# Patient Record
Sex: FEMALE | Race: OTHER
[De-identification: names, ages, dates, MRNs, and addresses within clinical notes are randomized per-mention and may not be internally consistent; named-entity substitution may affect disease eponyms.]

---

## 2017-12-14 ENCOUNTER — HOSPITAL ENCOUNTER (EMERGENCY)
Dept: HOSPITAL 11 - JP.ED | Age: 42
Discharge: HOME | End: 2017-12-14
Payer: SELF-PAY

## 2017-12-14 DIAGNOSIS — F17.210: ICD-10-CM

## 2017-12-14 DIAGNOSIS — J11.1: Primary | ICD-10-CM

## 2017-12-14 PROCEDURE — 99284 EMERGENCY DEPT VISIT MOD MDM: CPT

## 2017-12-14 NOTE — EDM.PDOC
ED HPI GENERAL MEDICAL PROBLEM





- General


Chief Complaint: Gastrointestinal Problem


Stated Complaint: DIARRHEA


Time Seen by Provider: 12/14/17 15:29


Source of Information: Reports: Patient


History Limitations: Reports: No Limitations





- History of Present Illness


INITIAL COMMENTS - FREE TEXT/NARRATIVE: 





pt works at [employer] and was sent home from work last nite because vomiting and 

diarrhea.  She is not feeling well enough to be at work tonight. 


Onset: Other (last nite. )


Duration: Hour(s):, Other (pt has been holding fluids sown. )


Location: Reports: Abdomen, Other (pt is not hAVING PAIN. )


Associated Symptoms: Reports: Other ( PT HAS HAD DIARRHEA. )


  ** dENIES


Pain Score (Numeric/FACES): 0





- Related Data


 Allergies











Allergy/AdvReac Type Severity Reaction Status Date / Time


 


No Known Allergies Allergy   Verified 12/14/17 15:04











Home Meds: 


 Home Meds





NK [No Known Home Meds]  12/14/17 [History]











Past Medical History


HEENT History: Reports: Impaired Vision


OB/GYN History: Reports: Pregnancy





- Infectious Disease History


Infectious Disease History: Reports: Chicken Pox





- Past Surgical History


Female  Surgical History: Reports: Tubal Ligation





Social & Family History





- Tobacco Use


Smoking Status *Q: Current Some Day Smoker


Years of Tobacco use: 20


Packs/Tins Daily: 1


Used Tobacco, but Quit: No





- Caffeine Use


Caffeine Use: Reports: Coffee, Energy Drinks





- Recreational Drug Use


Recreational Drug Use: No





ED ROS GENERAL





- Review of Systems


Review Of Systems: See Below


Constitutional: Reports: Chills


HEENT: Reports: No Symptoms


Respiratory: Reports: No Symptoms


Cardiovascular: Reports: No Symptoms


Endocrine: Reports: No Symptoms


GI/Abdominal: Reports: Diarrhea, Decreased Appetite, Vomiting


: Reports: No Symptoms





ED EXAM, GI/ABD





- Physical Exam


Exam: See Below


Text/Narrative:: 





PT GOT SICK AT WORK .  SHE HAD VOMITING AND HAD SIG DIARRHEA. sHE DOES NOT FEEL 

WELL ENOUGH TO BE AT WORK TONIGHT. 


Exam Limited By: No Limitations


General Appearance: Alert, No Apparent Distress


Ears: Normal TMs


Nose: Normal Inspection


Throat/Mouth: Normal Inspection


Head: Atraumatic


Neck: Normal Inspection


Respiratory/Chest: No Respiratory Distress


Cardiovascular: Regular Rate, Rhythm


GI/Abdominal Exam: Other (MULTIPLE SCARS ON HER ABDOMAN. sHE HAS NO TENDERNESS. 

)


 (Female) Exam: Deferred


Rectal (Female) Exam: Deferred


Back Exam: Normal Inspection


Extremities: Normal Inspection





Course





- Vital Signs


Last Recorded V/S: 


 Last Vital Signs











Temp  36.6 C   12/14/17 15:17


 


Pulse  91   12/14/17 15:17


 


Resp  18   12/14/17 15:17


 


BP  141/84 H  12/14/17 15:17


 


Pulse Ox  96   12/14/17 15:17














- Orders/Labs/Meds


Meds: 


Medications














Discontinued Medications














Generic Name Dose Route Start Last Admin





  Trade Name Malathi  PRN Reason Stop Dose Admin


 


Acetaminophen  650 mg  12/14/17 15:57  12/14/17 16:01





  Tylenol  PO  12/14/17 15:58  650 mg





  NOW ONE   Administration


 


Loperamide HCl  4 mg  12/14/17 15:36  12/14/17 15:56





  Imodium  PO  12/14/17 15:37  4 mg





  ONETIME ONE   Administration


 


Loperamide HCl  Confirm  12/14/17 15:58  





  Imodium  Administered  12/14/17 15:59  





  Dose   





  2 mg   





  .ROUTE   





  .STK-MED ONE   


 


Ondansetron HCl  4 mg  12/14/17 15:28  12/14/17 15:33





  Zofran Odt  PO  12/14/17 15:29  4 mg





  ONETIME ONE   Administration














- Re-Assessments/Exams


Free Text/Narrative Re-Assessment/Exam: 





12/14/17 15:35


PT FEELS LIKE SHE HAS BEEN ABLE TO BE HYDRATED WITH THE FLUIDS SHE IS TAKING 

IN. sHE IS HAVING SIG  DIARRHEA


12/14/17 16:04


 PT WAS GIVEN SUBLING ZOFORAN AND SHE WAS ABLE TO KEEP FLUIDS DOWN. 





Departure





- Departure


Time of Disposition: 16:05


Disposition: Home, Self-Care 01


Condition: Fair


Clinical Impression: 


 Flu syndrome








- Discharge Information


Referrals: 


PCP,None [Primary Care Provider] - 


Forms:  ED Department Discharge


Care Plan Goals: 


 CLEAR LIQUID DIET, ZOFORAN SUBLING Q6H AS NEEDED FOR NAUSEA AND VOMITING, 

IMMODIUM 2 TABS AFTER EACH LOOSE STOOL.  NO WORK TONIGHT.

## 2018-06-05 ENCOUNTER — HOSPITAL ENCOUNTER (EMERGENCY)
Dept: HOSPITAL 11 - JP.ED | Age: 43
Discharge: HOME | End: 2018-06-05
Payer: SELF-PAY

## 2018-06-05 DIAGNOSIS — R06.02: ICD-10-CM

## 2018-06-05 DIAGNOSIS — R51: ICD-10-CM
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
## 2018-06-05 DIAGNOSIS — F17.210: ICD-10-CM

## 2018-06-05 DIAGNOSIS — T14.8XXA: Primary | ICD-10-CM

## 2018-06-05 NOTE — EDM.PDOC
ED HPI GENERAL MEDICAL PROBLEM





- General


Chief Complaint: Fever


Stated Complaint: SOB/DOES NOT FEEL WELL


Time Seen by Provider: 06/05/18 12:51


Source of Information: Reports: Patient, Family, RN Notes Reviewed


History Limitations: Reports: Language Barrier





- History of Present Illness


INITIAL COMMENTS - FREE TEXT/NARRATIVE: 





21-year-old female presents to the emergency department day with complaints of 

shortness of breath and not feeling well, she is a native [place] speaking so a 

language barrier does exist.  has had fever for 2 days, feels short of breath, 

no sputum production


  ** Headache


Pain Score (Numeric/FACES): 10





- Related Data


 Allergies











Allergy/AdvReac Type Severity Reaction Status Date / Time


 


No Known Allergies Allergy   Verified 06/05/18 12:36











Home Meds: 


 Home Meds





NK [No Known Home Meds]  12/14/17 [History]











Past Medical History


HEENT History: Reports: Impaired Vision


OB/GYN History: Reports: Pregnancy


Neurological History: Reports: Migraines





- Infectious Disease History


Infectious Disease History: Reports: Chicken Pox





- Past Surgical History


GI Surgical History: Reports: Other (See Below)


Other GI Surgeries/Procedures: "tummy tuck" surgery, then further surgery d/t 

infection.


Female  Surgical History: Reports: Tubal Ligation





Social & Family History





- Tobacco Use


Smoking Status *Q: Light Tobacco Smoker


Years of Tobacco use: 26


Packs/Tins Daily: 0.5





- Caffeine Use


Caffeine Use: Reports: Coffee, Energy Drinks





- Recreational Drug Use


Recreational Drug Use: No





ED ROS GENERAL





- Review of Systems


Review Of Systems: See Below


Constitutional: Denies: Fever, Chills


HEENT: Reports: No Symptoms


Respiratory: Reports: Shortness of Breath.  Denies: Cough, Sputum


Cardiovascular: Reports: No Symptoms


GI/Abdominal: Reports: No Symptoms


: Reports: No Symptoms


Musculoskeletal: Reports: No Symptoms


Skin: Reports: No Symptoms


Neurological: Reports: Headache


Psychiatric: Reports: No Symptoms





ED EXAM, GENERAL





- Physical Exam


Exam: See Below


Free Text/Narrative:: 





General: Female, not in any distress, alert   HEENT: head is atraumatic 

normocephalic, eyes pupils equal round reactive to light, sclera clear no 

conjunctivitis appreciated.  Ears tympanic membranes clear and gray landmarks 

and light reflex are present bilaterally canals are clear.  Nose no septal 

deviation, nares are clear, no blood present.  Mouth mucosa is moist and pink 

no erythema or exudate noted in soft palate, tongue is midline uvula is midline

, dentition is intact.


Neck: Supple no thyromegaly no tracheal deviation.


Nodes: Cervical nodes subclavicular nodes nontender no palpable lymphadenopathy 

noted.


Lungs: clear to auscultation bilaterally with symmetrical respirations, no 

adventitious noise appreciated.


CV: Regular rate and rhythm S1 and S2 appreciated no murmurs rubs or gallops 

noted.


Abdomen: Soft, nontender, no palpable masses or organomegaly appreciated, no 

distention no guarding bowel sounds are present, multiple abdominal surgical 

scars.


Neuro: Cranial nerves II through XII grossly intact


Skin: Warm and dry, intact


Extremities: No lower extremity edema appreciated,  





Course





- Vital Signs


Text/Narrative:: 





Nursing staff did find a tick crawling on her while in the emergency department


Last Recorded V/S: 


 Last Vital Signs











Temp  98.4 F   06/05/18 12:29


 


Pulse  81   06/05/18 12:29


 


Resp  16   06/05/18 12:29


 


BP  143/93 H  06/05/18 12:29


 


Pulse Ox  96   06/05/18 12:29














- Orders/Labs/Meds


Orders: 


 Active Orders 24 hr











 Category Date Time Status


 


 EKG Documentation Completion [RC] ASDIRECTED Care  06/05/18 13:07 Active


 


 UA W/MICROSCOPIC [URIN] Urgent Lab  06/05/18 13:25 Ordered


 


 EKG 12 Lead [EK] Stat Ther  06/05/18 13:07 Ordered











Labs: 


 Laboratory Tests











  06/05/18 06/05/18 06/05/18 Range/Units





  13:18 13:18 13:18 


 


WBC  10.2    (4.5-11.0)  K/uL


 


RBC  4.21    (3.30-5.50)  M/uL


 


Hgb  12.0    (12.0-15.0)  g/dL


 


Hct  37.4    (36.0-48.0)  %


 


MCV  89    (80-98)  fL


 


MCH  29    (27-31)  pg


 


MCHC  32    (32-36)  %


 


Plt Count  189    (150-400)  K/uL


 


Neut % (Auto)  63    (36-66)  %


 


Lymph % (Auto)  25    (24-44)  %


 


Mono % (Auto)  9 H    (2-6)  %


 


Eos % (Auto)  3    (2-4)  %


 


Baso % (Auto)  0    (0-1)  %


 


Sodium   139 L   (140-148)  mmol/L


 


Potassium   4.0   (3.6-5.2)  mmol/L


 


Chloride   105   (100-108)  mmol/L


 


Carbon Dioxide   27   (21-32)  mmol/L


 


Anion Gap   11.0   (5.0-14.0)  mmol/L


 


BUN   8   (7-18)  mg/dL


 


Creatinine   0.8   (0.6-1.0)  mg/dL


 


Est Cr Clr Drug Dosing   75.01   mL/min


 


Estimated GFR (MDRD)   > 60   (>60)  


 


Glucose   85   ()  mg/dL


 


Lactic Acid    1.0  (0.4-2.0)  mmol/L


 


Calcium   8.3 L   (8.5-10.1)  mg/dL


 


Total Bilirubin   0.3   (0.2-1.0)  mg/dL


 


AST   21   (15-37)  U/L


 


ALT   31   (12-78)  U/L


 


Alkaline Phosphatase   63   ()  U/L


 


Troponin I   < 0.017   (0.000-0.056)  ng/mL


 


Total Protein   6.8   (6.4-8.2)  g/dL


 


Albumin   2.8 L   (3.4-5.0)  g/dL


 


Globulin   4.0 H   (2.3-3.5)  g/dL


 


Albumin/Globulin Ratio   0.7 L   (1.2-2.2)  


 


Urine Color     


 


Urine Appearance     


 


Urine pH     (4.5-8.0)  


 


Ur Specific Gravity     (1.008-1.030)  


 


Urine Protein     (NEGATIVE)  mg/dL


 


Urine Glucose (UA)     (NEGATIVE)  mg/dL


 


Urine Ketones     (NEGATIVE)  mg/dL


 


Urine Occult Blood     (NEGATIVE)  


 


Urine Nitrite     (NEGATIVE)  


 


Urine Bilirubin     (NEGATIVE)  


 


Urine Urobilinogen     (NORMAL)  mg/dL


 


Ur Leukocyte Esterase     (NEGATIVE)  


 


Urine RBC     (0-5)  


 


Urine WBC     (0-5)  


 


Ur Epithelial Cells     


 


Amorphous Sediment     


 


Urine Bacteria     


 


Urine Mucus     














  06/05/18 Range/Units





  13:25 


 


WBC   (4.5-11.0)  K/uL


 


RBC   (3.30-5.50)  M/uL


 


Hgb   (12.0-15.0)  g/dL


 


Hct   (36.0-48.0)  %


 


MCV   (80-98)  fL


 


MCH   (27-31)  pg


 


MCHC   (32-36)  %


 


Plt Count   (150-400)  K/uL


 


Neut % (Auto)   (36-66)  %


 


Lymph % (Auto)   (24-44)  %


 


Mono % (Auto)   (2-6)  %


 


Eos % (Auto)   (2-4)  %


 


Baso % (Auto)   (0-1)  %


 


Sodium   (140-148)  mmol/L


 


Potassium   (3.6-5.2)  mmol/L


 


Chloride   (100-108)  mmol/L


 


Carbon Dioxide   (21-32)  mmol/L


 


Anion Gap   (5.0-14.0)  mmol/L


 


BUN   (7-18)  mg/dL


 


Creatinine   (0.6-1.0)  mg/dL


 


Est Cr Clr Drug Dosing   mL/min


 


Estimated GFR (MDRD)   (>60)  


 


Glucose   ()  mg/dL


 


Lactic Acid   (0.4-2.0)  mmol/L


 


Calcium   (8.5-10.1)  mg/dL


 


Total Bilirubin   (0.2-1.0)  mg/dL


 


AST   (15-37)  U/L


 


ALT   (12-78)  U/L


 


Alkaline Phosphatase   ()  U/L


 


Troponin I   (0.000-0.056)  ng/mL


 


Total Protein   (6.4-8.2)  g/dL


 


Albumin   (3.4-5.0)  g/dL


 


Globulin   (2.3-3.5)  g/dL


 


Albumin/Globulin Ratio   (1.2-2.2)  


 


Urine Color  Yellow  


 


Urine Appearance  Slightly cloudy  


 


Urine pH  7.0  (4.5-8.0)  


 


Ur Specific Gravity  1.015  (1.008-1.030)  


 


Urine Protein  Negative  (NEGATIVE)  mg/dL


 


Urine Glucose (UA)  Normal  (NEGATIVE)  mg/dL


 


Urine Ketones  Negative  (NEGATIVE)  mg/dL


 


Urine Occult Blood  Large  (NEGATIVE)  


 


Urine Nitrite  Negative  (NEGATIVE)  


 


Urine Bilirubin  Negative  (NEGATIVE)  


 


Urine Urobilinogen  Normal  (NORMAL)  mg/dL


 


Ur Leukocyte Esterase  Moderate  (NEGATIVE)  


 


Urine RBC  0-5  (0-5)  


 


Urine WBC  10-20 H  (0-5)  


 


Ur Epithelial Cells  Few  


 


Amorphous Sediment  Not seen  


 


Urine Bacteria  Rare  


 


Urine Mucus  Not seen  











Meds: 


Medications














Discontinued Medications














Generic Name Dose Route Start Last Admin





  Trade Name Freq  PRN Reason Stop Dose Admin


 


Ketorolac Tromethamine  30 mg  06/05/18 13:09  06/05/18 13:28





  Toradol  IM  06/05/18 13:10  30 mg





  ONETIME ONE   Administration





     





     





     





     














Departure





- Departure


Time of Disposition: 14:57


Disposition: Home, Self-Care 01


Condition: Good


Clinical Impression: 


Tick bite


Qualifiers:


 Encounter type: initial encounter Qualified Code(s): W57.XXXA - Bitten or 

stung by nonvenomous insect and other nonvenomous arthropods, initial encounter








- Discharge Information


Instructions:  Tick Bite Information, Adult, Easy-to-Read, Tick Bite Information

, Adult


Referrals: 


PCP,None [Primary Care Provider] - 


Forms:  ED Department Discharge


Additional Instructions: 


Take full course of antibiotics one tablet 2 times a day for the next 14 days, 

please follow-up with her primary care provider in the next 3-5 days for 

reevaluation, call return to the emergency department worsening of symptoms





- My Orders


Last 24 Hours: 


My Active Orders





06/05/18 13:07


EKG Documentation Completion [RC] ASDIRECTED 


EKG 12 Lead [EK] Stat 





06/05/18 13:25


UA W/MICROSCOPIC [URIN] Urgent 














- Assessment/Plan


Last 24 Hours: 


My Active Orders





06/05/18 13:07


EKG Documentation Completion [RC] ASDIRECTED 


EKG 12 Lead [EK] Stat 





06/05/18 13:25


UA W/MICROSCOPIC [URIN] Urgent 











Plan: 





Assessment





Acuity = acute





Site and laterality = body aches, headache, shortness of breath  





Etiology  = suspicious for tickborne illness  





Manifestations = none  





Location of injury =  Home





Lab values = CBC, CMP unremarkable, chest x-ray shows a small right pleural 

effusion EKG demonstrates a sinus rhythm no ST changes or depression  





Plan


I did discuss options with her as well as review her lab work and chest x-ray 

results plan is to treat with doxycycline 100 mg by mouth twice a day 14 days 

follow-up with primary care in the next 3-5 days for reevaluation  

















 This note was dictated using dragon voice recognition software please call 

with any questions on syntax or grammar.

## 2018-06-05 NOTE — CR
CHEST: 2 view

 

CLINICAL HISTORY:SOB

 

COMPARISON:None

 

FINDINGS:  Heart size and pulmonary vascularity are normal. There is some blunting of the right costo
phrenic angle. No infiltrates are seen. The there is some small nodular focus overlying the heart on 
the lateral image. This is not definitively seen on the PA view

 

 

IMPRESSION:  Small right pleural effusion

 

Small nodule in the anterior chest on lateral view is indeterminate. If patient has prior studies as 
would be helpful. If not either short-term follow-up or noncontrast CT chest should be considered

## 2021-10-12 ENCOUNTER — HOSPITAL ENCOUNTER (EMERGENCY)
Dept: HOSPITAL 11 - JP.ED | Age: 46
Discharge: HOME | End: 2021-10-12
Payer: COMMERCIAL

## 2021-10-12 DIAGNOSIS — G89.29: Primary | ICD-10-CM

## 2021-10-12 DIAGNOSIS — M25.561: ICD-10-CM

## 2021-10-12 DIAGNOSIS — M54.50: ICD-10-CM

## 2021-10-12 PROCEDURE — 99283 EMERGENCY DEPT VISIT LOW MDM: CPT

## 2021-10-12 PROCEDURE — 96372 THER/PROPH/DIAG INJ SC/IM: CPT

## 2021-10-12 NOTE — EDM.PDOC
ED HPI GENERAL MEDICAL PROBLEM





- General


Chief Complaint: Lower Extremity Injury/Pain


Stated Complaint: BACK AND KNEE PAIN


Time Seen by Provider: 10/12/21 15:25


Source of Information: Reports: Patient, Family


History Limitations: Reports: Language Barrier, Other (limited records)





- History of Present Illness


INITIAL COMMENTS - FREE TEXT/NARRATIVE: 





44 yo female with limited English skills presents with apparent acute on chronic

R knee and low back pain. Relates an injury that occurred at work last November.

Has seen Dr. Ugarte for this and is on hydrocodone already. States that she sees 

an orthopedist in [place] for this problem. Did not have a new injury recently. 

Did not call Dr. Ugarte before coming to the ER today. 


Onset: Other (11/2020)


Duration: Chronic, Getting Worse


Location: Reports: Back, Lower Extremity, Right


Quality: Reports: Ache


Severity: Severe


Improves with: Reports: Rest


Worsens with: Reports: Movement


Context: Reports: Other (See HPI)


Associated Symptoms: Reports: No Other Symptoms


Treatments PTA: Reports: Other (see below) (Has hydrocodone prescribed, it is 

not clear when she last took this. )





- Related Data


                                    Allergies











Allergy/AdvReac Type Severity Reaction Status Date / Time


 


No Known Allergies Allergy   Verified 10/30/18 18:40











Home Meds: 


                                    Home Meds





cephALEXin [Cephalexin] 500 mg PO QID #30 tablet 10/30/18 [Rx]











Past Medical History


HEENT History: Reports: Impaired Vision


OB/GYN History: Reports: Pregnancy


Neurological History: Reports: Migraines





- Infectious Disease History


Infectious Disease History: Reports: Chicken Pox





- Past Surgical History


GI Surgical History: Reports: Other (See Below)


Other GI Surgeries/Procedures: "tummy tuck" surgery, then further surgery d/t 

infection.


Female  Surgical History: Reports: Tubal Ligation





Social & Family History





- Caffeine Use


Caffeine Use: Reports: Coffee, Energy Drinks





Review of Systems





- Review of Systems


Review Of Systems: See Below


Constitutional: Reports: No Symptoms


Musculoskeletal: Reports: Back Pain (R low back), Joint Pain (R knee)


Skin: Reports: No Symptoms


Neurological: Reports: No Symptoms





ED EXAM, GENERAL





- Physical Exam


Exam: See Below


Exam Limited By: No Limitations


General Appearance: Alert, WD/WN, Mild Distress


Eye Exam: Bilateral Eye: Normal Inspection


Ears: Normal External Exam, Normal Canal, Hearing Grossly Normal


Ear Exam: Bilateral Ear: Auricle Normal, Canal Normal


Nose: Normal Inspection, No Blood


Throat/Mouth: Normal Lips, Normal Oropharynx, Normal Voice, No Airway Compromise


Head: Atraumatic, Normocephalic


Neck: Normal Inspection


Respiratory/Chest: No Respiratory Distress, Lungs Clear, Normal Breath Sounds, 

No Accessory Muscle Use


Back Exam: Normal Inspection


Extremities: Normal Inspection, No Pedal Edema, Other (No effusion, no increased

 warmth. I barely touch her R knee and she pulls away. Not able to perform a 

full knee exam. ).  No: Increased Warmth, Redness


Neurological: Alert, Oriented, CN II-XII Intact, Normal Cognition, No 

Motor/Sensory Deficits


Skin Exam: Warm, Dry, Intact, Normal Color, No Rash





Course





- Vital Signs


Text/Narrative:: 





Case discussed with Dr. Ugarte @ [facility]


Last Recorded V/S: 


                                Last Vital Signs











Temp  36.6 C   10/12/21 15:12


 


Pulse  76   10/12/21 15:12


 


Resp  16   10/12/21 15:12


 


BP  144/88 H  10/12/21 15:12


 


Pulse Ox  97   10/12/21 15:12














- Orders/Labs/Meds


Meds: 


Medications














Discontinued Medications














Generic Name Dose Route Start Last Admin





  Trade Name Freq  PRN Reason Stop Dose Admin


 


Ketorolac Tromethamine  30 mg  10/12/21 15:36 





  Ketorolac 30 Mg/Ml Sdv  IM  10/12/21 15:37 





  ONETIME ONE  














Departure





- Departure


Time of Disposition: 15:53


Disposition: Home, Self-Care 01


Condition: Fair


Clinical Impression: 


Chronic knee pain


Qualifiers:


 Laterality: right Qualified Code(s): M25.561 - Pain in right knee; G89.29 - 

Other chronic pain





Chronic low back pain


Qualifiers:


 Back pain laterality: right Sciatica presence: without sciatica Qualified 

Code(s): M54.50 - Low back pain, unspecified; G89.29 - Other chronic pain








- Discharge Information


*PRESCRIPTION DRUG MONITORING PROGRAM REVIEWED*: Not Applicable


*COPY OF PRESCRIPTION DRUG MONITORING REPORT IN PATIENT AMANDA: Not Applicable


Referrals: 


PCP,None [Primary Care Provider] - 


Forms:  ED Department Discharge


Additional Instructions: 


Continue your current medications. Use your walker to assist with ambulation. 

See Dr. Ugarte to discuss a pain clinic referral. 





Sepsis Event Note (ED)





- Focused Exam


Vital Signs: 


                                   Vital Signs











  Temp Pulse Resp BP Pulse Ox


 


 10/12/21 15:12  36.6 C  76  16  144/88 H  97